# Patient Record
Sex: MALE | Race: WHITE
[De-identification: names, ages, dates, MRNs, and addresses within clinical notes are randomized per-mention and may not be internally consistent; named-entity substitution may affect disease eponyms.]

---

## 2019-04-06 NOTE — ERPHSYRPT
- History of Present Illness


Time Seen by Provider: 04/06/19 14:58


Source: patient


Exam Limitations: no limitations


Patient Subjective Stated Complaint: right middle finger believed to have an 

ingrown nail that is infected.  Went to [facility] a week ago and was given an 

antibiotic and finger is still red and inflamed.


Triage Nursing Assessment: Pt's right middle finger is believed to have an 

ingrown nail that is infected.  Went to [facility] a week ago and was given an 

antibiotic and finger is still red and inflamed.  Pt has no feeling in his 

fingers due to neuropathy, right leg below the knee ampute


Physician History: 





Pt has been c/o infected right middle finger nail for 10 days. He was seen at 

an urgent care clinic last week and started on PO Keflex. He denies fever, 

chills, headaches, difficulty breathing or other complaints.


Occurred: days ago (10)


Method of Injury: other (denies)


Quality: constant


Severity of Pain-Max: moderate


Severity of Pain-Current: moderate


Extremities Pain Location: 3rd finger: right


Modifying Factors: Improves With: nothing


Associated Symptoms: none


Allergies/Adverse Reactions: 








No Known Drug Allergies Allergy (Verified 04/06/19 14:52)


 





Hx Tetanus, Diphtheria Vaccination/Date Given: Yes (1-2 years)





- Review of Systems


Constitutional: No Symptoms


Respiratory: No Symptoms


Cardiac: No Symptoms


Abdominal/Gastrointestinal: No Symptoms


Musculoskeletal: Other (right middle finger nail bed is painful, overgrown.)


Skin: No Symptoms


Neurological: No Symptoms


All Other Systems: Reviewed and Negative





- Past Medical History


Neurological History: TIA


Endocrine Medical History: Diabetes Type II


GI Medical History: GERD





- Past Surgical History


Past Surgical History: Yes


Musculoskeletal: Amputation





- Social History


Smoking Status: Former smoker


Exposure to second hand smoke: No


Drug Use: none


Patient Lives Alone: No





- Nursing Vital Signs


Nursing Vital Signs: 





 Initial Vital Signs











Temperature  98.8 F   04/06/19 14:37


 


Pulse Rate  118 H  04/06/19 14:37


 


Blood Pressure  162/86   04/06/19 14:37


 


O2 Sat by Pulse Oximetry  95   04/06/19 14:37








 Pain Scale











Pain Intensity                 0

















- Physical Exam


General Appearance: no apparent distress


Eyes, Ears, Nose, Throat Exam: normal ENT inspection


Neck Exam: normal inspection, non-tender


Cardiovascular/Respiratory Exam: chest non-tender, normal breath sounds, heart 

sounds normal


Abdominal Exam: non-tender


Back Exam: normal inspection


Hand Exam: no evidence of injury (right middle finger nail, ulnar nail bed is 

swollen, about 1 mm of the nail edge has been cut all the way to the matrix,( 

patient himself cut it out a week ago) but there is no abscess, or drainage 

only granulation tissue overgrow, no severe erythema or edema. )


Neuro/Tendon Exam: normal motor functions


Skin Exam: normal color, warm, dry


**SpO2 Interpretation**: normal


SpO2: 95


O2 Delivery: Room Air





- Course


Nursing assessment & vital signs reviewed: Yes





- Progress


Progress: unchanged


Progress Note: 





04/06/19 15:14


I explained him, that this ingrown nail is currently not infected, it is 

becoming a chronic condition, and there is no abscess or acute infection we 

could correct. He will need to contact a surgeon ( hand or plastic  surgeon) to 

correct the nail overgrowth. He understood and will follow up with a surgeon 

next week.


Counseled pt/family regarding: diagnosis, need for follow-up (with surgeon)





- Departure


Departure Disposition: Home


Clinical Impression: 


 Ingrown fingernail





Condition: Stable


Critical Care Time: No


Instructions:  Paronychia


Additional Instructions: 


Continue daily antiseptic soaks, and antibiotic ointments, and follow up with 

surgeon next week!

## 2019-04-21 NOTE — ERPHSYRPT
- History of Present Illness


Source: patient


Exam Limitations: no limitations


Patient Subjective Stated Complaint: STATES DIFFICULTY PEEING. USUALLY FILLS 2 

URINALS THROUGHOUT NIGHT. ONLY WENT APPROX 1/2 CUP LAST NIGHT. STATES THIS IS 

NOT NORMAL FOR HIM. HE DRANK 3 BOTTLES OF WATER THROUGHOUT NIGHT.


Triage Nursing Assessment: ALERT AND ORIENTED. USES WHEELCHAIR FOR MOBILITY. 

ABLE TO VOID SMALL AMOUNT OF DARK YELLOW URINE IN SPECIMIN CUP WITHOUT 

DIFFICULTY. NO EDEMA.


Physician History: 





Pt is a 58 y/o male that presented to the ED with signs and symptoms of UTI.  

Pt states, that his urine out put through the night, was very low, and he feels 

some pressure when he is urinating.  Pt states, has DM II, but he did not see 

his PCP for a couple of years.  He states, his previous PCP, still prescribes 

his meds for him.  Pt denies F/C/S.  No SOB or cough.  No chest discomfort.


Timing/Duration: today


Activites at Onset: none


Severity of Pain-Max: none


Severity of Pain-Current: none


Modifying Factors: Improves With: nothing


Associated Symptoms: denies symptoms (decrease in urine output)


Allergies/Adverse Reactions: 








No Known Drug Allergies Allergy (Verified 04/06/19 14:52)


 





Hx Tetanus, Diphtheria Vaccination/Date Given: Yes (2016)


Hx Influenza Vaccination/Date Given: No


Hx Pneumococcal Vaccination/Date Given: No





- Past Medical History


Neurological History: TIA


Endocrine Medical History: Diabetes Type II


GI Medical History: GERD





- Past Surgical History


Past Surgical History: Yes


Musculoskeletal: Amputation


Other Surgical History: CYST REMOVED RIGHT WRIST





- Social History


Smoking Status: Former smoker


Exposure to second hand smoke: No


Drug Use: none


Patient Lives Alone: No





- Review of Systems


Constitutional: No Fever, No Chills


Eyes: No Symptoms


Ears, Nose, & Throat: No Symptoms


Respiratory: No Cough, No Dyspnea


Cardiac: No Chest Pain, No Edema, No Syncope


Abdominal/Gastrointestinal: No Abdominal Pain, No Nausea, No Vomiting, No 

Diarrhea


Genitourinary Symptoms: Frequency, Other (decrease in urine out put.)


Musculoskeletal: No Back Pain, No Neck Pain


Skin: No Rash


Neurological: No Dizziness, No Focal Weakness, No Sensory Changes


Psychological: No Symptoms


Endocrine: Polyuria





- Nursing Vital Signs


Nursing Vital Signs: 





 Initial Vital Signs











Temperature  98 F   04/21/19 10:53


 


Pulse Rate  128 H  04/21/19 10:53


 


Respiratory Rate  22   04/21/19 10:53


 


Blood Pressure  148/103   04/21/19 10:53


 


O2 Sat by Pulse Oximetry  93 L  04/21/19 10:53








 Pain Scale











Pain Intensity                 0

















- Physical Exam


General Appearance: no apparent distress, alert


Eye Exam: PERRL/EOMI


Ears, Nose, Throat Exam: pharynx normal, moist mucous membranes


Neck Exam: normal inspection, supple


Respiratory Exam: normal breath sounds, lungs clear


Cardiovascular Exam: regular rate/rhythm, No edema


Gastrointestinal/Abdomen Exam: soft, No tenderness


Back Exam: normal inspection, No CVA tenderness


Extremity Exam: normal inspection, normal range of motion, other (BVKA of R LE)

, No pedal edema


Neurologic Exam: alert, oriented x 3, cooperative, sensation nml, No motor 

deficits


SpO2: 98





- Course


Nursing assessment & vital signs reviewed: Yes


Ordered Tests: 





 Active Orders 24 hr











 Category Date Time Status


 


 Clean Catch Urine Specimen STAT Care  04/21/19 10:46 Active


 


 BMP Stat Lab  04/21/19 10:45 Completed


 


 CBC W DIFF Stat Lab  04/21/19 10:45 Results


 


 CULTURE,URINE Stat Lab  04/21/19 11:00 Received


 


 Manual Differential NC Stat Lab  04/21/19 10:45 Results


 


 Pathologist Review Stat Lab  04/21/19 10:45 Results


 


 Urinalysis with Microscopy Stat Lab  04/21/19 11:00 Completed








Medication Summary














Discontinued Medications














Generic Name Dose Route Start Last Admin





  Trade Name Uli  PRN Reason Stop Dose Admin


 


Sodium Chloride  1,000 mls @ 999 mls/hr  04/21/19 11:23  04/21/19 15:30





  Sodium Chloride 0.9% 1000 Ml  IV  04/21/19 12:23  Infused





  .Q1H1M STA   Infusion





     





     





     





     


 


Sodium Chloride  Confirm  04/21/19 11:27  





  Sodium Chloride 0.9% 1000 Ml  Administered  04/21/19 11:28  





  Dose   





  1,000 mls @ ud   





  .ROUTE   





  .STK-MED ONE   





     





     





     





     


 


Ceftriaxone Sodium/Dextrose  1 g in 50 mls @ 100 mls/hr  04/21/19 13:02  04/21/ 19 15:31





  Rocephin 1 Gm-D5w 50 Ml Bag**  IV  04/21/19 13:31  Infused





  STAT STA   Infusion





     





     





     





     


 


Ceftriaxone Sodium/Dextrose  Confirm  04/21/19 13:07  





  Rocephin 1 Gm-D5w 50 Ml Bag**  Administered  04/21/19 13:08  





  Dose   





  1 g in 50 mls @ ud   





  IV   





  .STK-MED ONE   





     





     





     





     


 


Sodium Chloride  1,000 mls @ 999 mls/hr  04/21/19 14:36  04/21/19 14:40





  Sodium Chloride 0.9% 1000 Ml  IV  04/21/19 15:36  999 mls/hr





  .Q1H1M STA   Administration





     





     





     





     


 


Sodium Chloride  Confirm  04/21/19 14:39  





  Sodium Chloride 0.9% 1000 Ml  Administered  04/21/19 14:40  





  Dose   





  1,000 mls @ ud   





  .ROUTE   





  .STK-MED ONE   





     





     





     





     


 


Insulin Aspart  10 unit  04/21/19 11:53  04/21/19 11:58





  Novolog Insulin**  SQ  04/21/19 11:54  10 unit





  STAT ONE   Administration





     





     





     





     


 


Insulin Aspart  Confirm  04/21/19 11:57  





  Novolog Insulin**  Administered  04/21/19 11:58  





  Dose   





  10 unit   





  .ROUTE   





  .STK-MED ONE   





     





     





     





     











Lab/Rad Data: 





 Laboratory Result Diagrams





 04/21/19 10:45 





 04/21/19 10:45 





 Laboratory Results











  04/21/19 04/21/19 04/21/19 Range/Units





  11:00 10:45 10:45 


 


WBC    21.5 H  (4.0-10.5)  K/mm3


 


RBC    7.85 H*  (4.1-5.6)  M/mm3


 


Hgb    14.6  (12.5-18.0)  gm/dl


 


Hct    44.7  (42-50)  %


 


MCV    56.9 L  ()  fl


 


MCH    18.5 L  (26-32)  pg


 


MCHC    32.7  (32-36)  g/dl


 


RDW    22.4 H  (11.5-14.0)  %


 


Plt Count    195  (150-450)  K/mm3


 


Segmented Neutrophils    79 H  (36.-66.)  %


 


Band Neutrophils    6 H  (0.0-2.0)  %


 


Lymphocytes (Manual)    7 L  (24-44)  %


 


Monocytes (Manual)    7  (0.0-12.0)  %


 


Basophils (Manual)    1  (0.0-1.0)  %


 


Platelet Estimate    NORMAL  (NORMAL)  


 


RBC Morphology    ABNORMAL  


 


Polychromasia    2+  


 


Poikilocytosis    2+  


 


Anisocytosis    3+  


 


Microcytosis    3+  


 


Tear Drop Cells    1+  


 


Ovalocytes    1+  


 


Smear Path Review    Pending  


 


Sodium   133 L   (137-145)  mmol/L


 


Potassium   4.4   (3.5-5.1)  mmol/L


 


Chloride   98   ()  mmol/L


 


Carbon Dioxide   24   (22-30)  mmol/L


 


Anion Gap   15.6 H   (5-15)  MEQ/L


 


BUN   18   (9-20)  mg/dL


 


Creatinine   0.94   (0.66-1.25)  mg/dL


 


Estimated GFR   > 60.0   ML/MIN


 


Glucose   324 H   ()  mg/dL


 


Calcium   10.1   (8.4-10.2)  mg/dL


 


Urine Color  YELLOW    (YELLOW)  


 


Urine Appearance  CLOUDY    (CLEAR)  


 


Urine pH  5.0    (5-6)  


 


Ur Specific Gravity  1.030    (1.005-1.025)  


 


Urine Protein  300    (Negative)  


 


Urine Ketones  TRACE    (NEGATIVE)  


 


Urine Blood  50    (0-5)  Jerardo/ul


 


Urine Nitrite  NEGATIVE    (NEGATIVE)  


 


Urine Bilirubin  NEGATIVE    (NEGATIVE)  


 


Urine Urobilinogen  4    (0-1)  mg/dL


 


Ur Leukocyte Esterase  2+    (NEGATIVE)  


 


Urine WBC (Auto)  16-25    (0-5)  /HPF


 


Urine RBC (Auto)  6-10    (0-2)  /HPF


 


U Epithel Cells (Auto)  FEW    (FEW)  /HPF


 


Urine Bacteria (Auto)  MODERATE    (NEGATIVE)  /HPF


 


Amorphous Crystals  MODERATE    (NEGATIVE)  /HPF


 


Urine Glucose  1000    (NEGATIVE)  mg/dL














- Progress


Progress: improved


Progress Note: 





04/21/19 15:59


Pt had UA that showed elevated leukocyte esterase, and increase in WBCs.  Pt 

had elevated BG.  sCr was normal.  Pt was given IVF 2 lit bolus, and his 

urination improved.  He got Ceftriaxone IV, and Humalog 10iu once.  Pt is 

feeling much better and is cleared to D/C on Omnicef PO.  He should get a PCP 

in the area, and f/u closly, as I am sure that his A1C is high.


Will see patient in: office


Counseled pt/family regarding: need for follow-up





- Departure


Departure Disposition: Home


Clinical Impression: 


 UTI (urinary tract infection)





Condition: Stable


Critical Care Time: No


Referrals: 


DOCTOR,NO FAMILY [Primary Care Provider] - 


Additional Instructions: 


F/U with PCP, and make sure to check BG and take meds, as ordered.


Prescriptions: 


Cefdinir [Omnicef] 300 mg PO BID 7 Days #14 capsule

## 2023-02-11 ENCOUNTER — HOSPITAL ENCOUNTER (EMERGENCY)
Dept: HOSPITAL 33 - ED | Age: 63
Discharge: HOME | End: 2023-02-11
Payer: COMMERCIAL

## 2023-02-11 VITALS — SYSTOLIC BLOOD PRESSURE: 140 MMHG | DIASTOLIC BLOOD PRESSURE: 86 MMHG

## 2023-02-11 VITALS — OXYGEN SATURATION: 98 % | HEART RATE: 111 BPM

## 2023-02-11 DIAGNOSIS — Z79.4: ICD-10-CM

## 2023-02-11 DIAGNOSIS — E11.42: ICD-10-CM

## 2023-02-11 DIAGNOSIS — Z79.899: ICD-10-CM

## 2023-02-11 DIAGNOSIS — T23.221A: Primary | ICD-10-CM

## 2023-02-11 PROCEDURE — 99281 EMR DPT VST MAYX REQ PHY/QHP: CPT

## 2023-02-11 PROCEDURE — 87070 CULTURE OTHR SPECIMN AEROBIC: CPT

## 2023-02-11 NOTE — ERPHSYRPT
- History of Present Illness


Time Seen by Provider: 02/11/23 15:44


Source: patient


Exam Limitations: no limitations


Physician History: 





She is a 63-year-old white male who has diabetes who has profound peripheral 

neuropathy.  He has numbness in the hands and in the lower extremities.  In fact

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 

infection that he did not realize was there.  He presents today with a complaint

of a burn suffered to the right middle finger middle phalanx dorsum which now 

has a small ulcer where it was blistered and some erythema.


Timing/Duration: day(s) (2 days), worse


Quality: other (No pain secondary to peripheral neuropathy)


Location: hands (Right middle finger)


Possible Causes: other (Burn patient was unaware of it occurring at the time)


Allergies/Adverse Reactions: 








No Known Drug Allergies Allergy (Verified 02/11/23 15:33)


   





Home Medications: 








Carvedilol 3.125 mg*** [Coreg 3.125 MG***] 1 tab PO BID 02/11/23 [History]


Gabapentin [Neurontin] 1 cap PO TID 02/11/23 [History]


Insulin Lispro [Admelog Solostar] 50 units SQ BID 02/11/23 [History]


Loratadine 10 mg*** [Claritin 10 mg***] 1 tab PO DAILY 02/11/23 [History]


Omeprazole Magnesium 1 cap PO TID 02/11/23 [History]


Simvastatin 1 tab PO HS 02/11/23 [History]





Hx Tetanus, Diphtheria Vaccination/Date Given: Yes (2016)


Hx Influenza Vaccination/Date Given: No


Hx Pneumococcal Vaccination/Date Given: No





- Review of Systems


Constitutional: No Fever, No Chills


Eyes: No Symptoms


Ears, Nose, & Throat: No Symptoms


Respiratory: No Cough, No Dyspnea


Cardiac: No Chest Pain, No Edema, No Syncope


Abdominal/Gastrointestinal: No Abdominal Pain, No Nausea, No Vomiting, No 

Diarrhea


Genitourinary Symptoms: No Dysuria


Musculoskeletal: No Back Pain, No Neck Pain


Skin: No Rash


Neurological: Parasthesia, Other (Peripheral neuropathy), No Dizziness, No Focal

 Weakness, No Sensory Changes


Psychological: No Symptoms


Endocrine: No Symptoms


All Other Systems: Reviewed and Negative





- Past Medical History


Neurological History: TIA


Endocrine Medical History: Diabetes Type II


GI Medical History: GERD





- Past Surgical History


Past Surgical History: Yes


Musculoskeletal: Amputation


Other Surgical History: CYST REMOVED RIGHT WRIST





- Social History


Smoking Status: Former smoker


Exposure to second hand smoke: No


Drug Use: none


Patient Lives Alone: No





- Physical Exam


General Appearance: no apparent distress, alert


Eye Exam: PERRL/EOMI, eyes nml inspection, other (Right eye has some clouding of

 the cornea)


Ears, Nose, Throat Exam: normal ENT inspection, pharynx normal, moist mucous 

membranes


Neck Exam: normal inspection, non-tender, supple, full range of motion


Respiratory Exam: normal breath sounds, lungs clear, No respiratory distress


Cardiovascular Exam: regular rate/rhythm


Gastrointestinal/Abdomen Exam: soft, mass, No tenderness


Back Exam: normal inspection, normal range of motion, No CVA tenderness, No 

vertebral tenderness


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


Neurologic Exam: alert, oriented x 3, cooperative, sensory deficit


Skin Exam: other (Burn to the right middle finger small ulcer and some 

surrounding erythema)


**SpO2 Interpretation**: normal


SpO2: 98


O2 Delivery: Room Air





- Course


Nursing assessment & vital signs reviewed: Yes


Ordered Tests: 





                               Active Orders 24 hr











 Category Date Time Status


 


 CULTURE,WOUND Stat Lab  02/11/23 15:43 Ordered








Medication Summary











Generic Name Dose Route Start Last Admin





  Trade Name Freq  PRN Reason Stop Dose Admin


 


Bacitracin Zinc  0.9 each  02/11/23 15:43 





  Bacitracin Packet 1 Each Pckt  TP  02/11/23 15:44 





  STAT ONE  














- Progress


Progress: improved


Progress Note: 





02/11/23 15:49


Nursing staff applied bacitracin after culture was taken of the wound and a 

sterile dressing applied.





Medical Desision Making





- Social Determinants of Health


Patient's diagnosis & treatment plan are significantly: Unemployed


Limited access to: transportation (And mobility)





- Diagnostic Testing


Diagnostic Testing: 


Diagnostic tests were ordered,analyzed, and reviewed by me and used in my 

medical decision making for this patient. 


Radiologic studies (if ordered) were read by me initially then discussed with 

the radiologist .








- Risk of complications


Low Risk: Low risk of morbidity from additional dx testing or treatment


The pt has a mod risk of morbidity or mortality based on: Need for prescription 

drug management





- Departure


Departure Disposition: Home


Clinical Impression: 


 Partial thickness burn of right middle finger





Condition: Stable


Critical Care Time: No


Instructions:  Skin Burns (DC)


Prescriptions: 


Doxycycline Hyclate 100 mg*** [Vibramycin 100 MG***] 100 mg PO BID 10 Days #20 

tab